# Patient Record
Sex: FEMALE | Race: WHITE | URBAN - METROPOLITAN AREA
[De-identification: names, ages, dates, MRNs, and addresses within clinical notes are randomized per-mention and may not be internally consistent; named-entity substitution may affect disease eponyms.]

---

## 2018-09-11 ENCOUNTER — HISTORICAL (OUTPATIENT)
Dept: ADMINISTRATIVE | Facility: HOSPITAL | Age: 28
End: 2018-09-11

## 2018-09-11 LAB
ALBUMIN SERPL-MCNC: 4.7 GM/DL (ref 3.4–5)
ALBUMIN/GLOB SERPL: 1 RATIO (ref 1–2)
ALP SERPL-CCNC: 62 UNIT/L (ref 45–117)
ALT SERPL-CCNC: 26 UNIT/L (ref 12–78)
AST SERPL-CCNC: 17 UNIT/L (ref 15–37)
BILIRUB SERPL-MCNC: 0.5 MG/DL (ref 0.2–1)
BILIRUBIN DIRECT+TOT PNL SERPL-MCNC: 0.1 MG/DL
BILIRUBIN DIRECT+TOT PNL SERPL-MCNC: 0.4 MG/DL
BUN SERPL-MCNC: 13 MG/DL (ref 7–18)
CALCIUM SERPL-MCNC: 9.5 MG/DL (ref 8.5–10.1)
CHLORIDE SERPL-SCNC: 104 MMOL/L (ref 98–107)
CO2 SERPL-SCNC: 28 MMOL/L (ref 21–32)
CREAT SERPL-MCNC: 0.8 MG/DL (ref 0.6–1.3)
GLOBULIN SER-MCNC: 4 GM/ML (ref 2.3–3.5)
GLUCOSE SERPL-MCNC: 86 MG/DL (ref 74–106)
HAV IGM SERPL QL IA: NONREACTIVE
HBV CORE IGM SERPL QL IA: NONREACTIVE
HBV SURFACE AG SERPL QL IA: NEGATIVE
HCV AB SERPL QL IA: NONREACTIVE
HIV 1+2 AB+HIV1 P24 AG SERPL QL IA: NONREACTIVE
POTASSIUM SERPL-SCNC: 3.9 MMOL/L (ref 3.5–5.1)
PROT SERPL-MCNC: 8.7 GM/DL (ref 6.4–8.2)
SODIUM SERPL-SCNC: 137 MMOL/L (ref 136–145)
T PALLIDUM AB SER QL: NONREACTIVE
TESTOST SERPL-MCNC: 37 NG/DL (ref 14–76)
TSH SERPL-ACNC: 1.4 MIU/L (ref 0.36–3.74)

## 2018-09-17 ENCOUNTER — HISTORICAL (OUTPATIENT)
Dept: WOUND CARE | Facility: HOSPITAL | Age: 28
End: 2018-09-17

## 2018-09-17 LAB
BUN SERPL-MCNC: 12 MG/DL (ref 7–18)
CALCIUM SERPL-MCNC: 8.8 MG/DL (ref 8.5–10.1)
CHLORIDE SERPL-SCNC: 106 MMOL/L (ref 98–107)
CO2 SERPL-SCNC: 30 MMOL/L (ref 21–32)
CREAT SERPL-MCNC: 0.8 MG/DL (ref 0.6–1.3)
CREAT/UREA NIT SERPL: 15
GLUCOSE SERPL-MCNC: 75 MG/DL (ref 74–106)
POTASSIUM SERPL-SCNC: 3.8 MMOL/L (ref 3.5–5.1)
SODIUM SERPL-SCNC: 141 MMOL/L (ref 136–145)

## 2022-05-02 NOTE — HISTORICAL [facility] CERNER
This is a historical note converted from [employer]. Formatting and pictures may have been removed.  Please reference [facility] for original formatting and attached multimedia. Chief Complaint  annual  History of Present Illness  Pt is  here for annual gyn exam. Reports hx of abnormal pap as a teenager. She is uncertain if intervention was required. Last pap 2 yrs ago at outside facility. Pt reports monthly periods lasting 4-5 days. Changes pad/tampon every 2 hrs. Pt has paragard IUD for contraception placed in . Pt states since having IUD she notices heavier periods with cramping but states last period was normal. Pt reports mild chin hair growth that is new for her. Admits to fly hx of similar hair growth pattern.?Pt would like treatment for this. Denies acne/irregular menses. Denies pain. Denies urinary complaints. Denies hx of STIs. No chronic medical hx. Not currently on any meds. Pt is nonsmoker.  Review of Systems  Negative except HPI  Physical Exam  Vitals & Measurements  T:?36.7? ?C (Oral)? HR:?91(Peripheral)? RR:?18? BP:?113/73?  HT:?165?cm? HT:?165?cm? WT:?76.2?kg? WT:?76.2?kg? BMI:?27.99?  General: Alert and oriented, No acute distress.  Breast: No mass, No tenderness, No discharge.  Gastrointestinal: Soft, Non-tender.  Gynecology:  Labia: Bilateral, Majora, Minora, Within normal limits.  Vagina: Within normal limits.  Cervix: No cervical motion tenderness, No lesions. paragard iud strings visible  Uterus: Mobile, Not tender.  Ovaries: Not tender, No mass.  Integumentary: Warm, Dry.  Neurologic: Alert, Oriented.  Psychiatric: Cooperative, Appropriate mood & affect.  Assessment/Plan  1.?Pap smear for cervical cancer screening  Ordered:  Clinic Follow up, 18 9:45:00 CDT, 1 Year, Pap smear for cervical cancer screening, [facility]  Pathology Gyn Request, 18 9:40:00 CDT, AP Specimen, Thin Prep Pap Cervical-Auto/man screen, Routine GYN/Pap, Cervical, Thin Prep Pap, Normal, 18,  Previous Pap, 2 yrs, Abnormal Pap, IUD, Previous Pregnancy, Cervix Present, Routine, RT - Routine, Pap smear for cer...  Preventative Health Care New 18-39 years [id number] PC, Pap smear for cervical cancer screening  Hirsutism  Intrauterine device surveillance  Routine screening for STI (sexually transmitted infection), [facility], 09/11/18 9:50:00 CDT  ?  2.?Hirsutism  ? -very mild; pt endorses fly hx of similar hair pattern-likely idiopathic; will check labs to r/o other potential?causes; will start spironolactone 50mg daily-informed pt may take up to 6 months to see an improvement  Ordered:  Comprehensive Metabolic Panel, Routine collect, 09/11/18 9:48:00 CDT, Blood, Stop date 09/11/18 9:48:00 CDT, Lab Collect, Hirsutism, 09/11/18 9:48:00 CDT  DHEA (Dehydroepiandrosterone) Sulfate-[facility] [id number], Routine collect, 09/11/18 9:48:00 CDT, Blood, Stop date 09/11/18 9:48:00 CDT, Lab Collect, Hirsutism, 09/11/18 9:48:00 CDT  Preventative Health Care New 18-39 years [id number] PC, Pap smear for cervical cancer screening  Hirsutism  Intrauterine device surveillance  Routine screening for STI (sexually transmitted infection), [facility], 09/11/18 9:50:00 CDT  Testosterone Level Total, Routine collect, 09/11/18 9:48:00 CDT, Blood, Stop date 09/11/18 9:48:00 CDT, Lab Collect, Hirsutism, 09/11/18 9:48:00 CDT  Thyroid Stimulating Hormone, Routine collect, 09/11/18 9:48:00 CDT, Blood, Stop date 09/11/18 9:48:00 CDT, Lab Collect, Hirsutism, 09/11/18 9:48:00 CDT  ?  3.?Intrauterine device surveillance  Ordered:  Preventative Health Care New 18-39 years [id number] PC, Pap smear for cervical cancer screening  Hirsutism  Intrauterine device surveillance  Routine screening for STI (sexually transmitted infection), [facility], 09/11/18 9:50:00 CDT  ?  4.?Routine screening for STI (sexually transmitted infection)  Ordered:  Chlamydia trach and N. gonorrhea PCR, Routine collect, Cervical, Order for future visit, 09/11/18  9:40:00 CDT, Stop date 09/11/18 9:40:00 CDT, Nurse collect, Routine screening for STI (sexually transmitted infection)  Hepatitis Panel [facility], Now collect, 09/11/18 9:48:00 CDT, Blood, Stop date 09/11/18 9:48:00 CDT, Lab Collect, Routine screening for STI (sexually transmitted infection), 09/11/18 9:48:00 CDT  HIV 1 and 2, Now collect, 09/11/18 9:48:00 CDT, Blood, Stop date 09/11/18 9:48:00 CDT, Lab Collect, Routine screening for STI (sexually transmitted infection), 09/11/18 9:48:00 CDT  Preventative Health Care New 18-39 years [id number] PC, Pap smear for cervical cancer screening  Hirsutism  Intrauterine device surveillance  Routine screening for STI (sexually transmitted infection), [facility], 09/11/18 9:50:00 CDT  Syphilis Antibody (with Reflex RPR), Routine collect, 09/11/18 9:48:00 CDT, Blood, Stop date 09/11/18 9:48:00 CDT, Lab Collect, Routine screening for STI (sexually transmitted infection), 09/11/18 9:48:00 CDT  ?  Orders:  spironolactone, 25 mg = 1 tab(s), Oral, BID, # 60 tab(s), 11 Refill(s), Pharmacy: [id number]   Problem List/Past Medical History  Denies  Procedure/Surgical History  paragard inserted 2013  pylonidial cyst 2011  vaginal repair   Medications  ParaGard intrauterine device  Allergies  No Known Allergies  Social History  Alcohol  Never, 09/11/2018  Employment/School  Employed, Work/School description: [employer] living ., 09/11/2018  Sexual  Sexually active: Yes. Number of current partners 1. Sexual orientation: Heterosexual. History of sexual abuse: No., 09/11/2018  Substance Abuse  Never, 09/11/2018  Tobacco  Never smoker Use:., 09/11/2018  Family History  Diabetes mellitus type 2: Mother.